# Patient Record
Sex: FEMALE | Race: WHITE | NOT HISPANIC OR LATINO | Employment: OTHER | URBAN - METROPOLITAN AREA
[De-identification: names, ages, dates, MRNs, and addresses within clinical notes are randomized per-mention and may not be internally consistent; named-entity substitution may affect disease eponyms.]

---

## 2024-10-03 ENCOUNTER — OFFICE VISIT (OUTPATIENT)
Dept: FAMILY MEDICINE | Facility: CLINIC | Age: 59
End: 2024-10-03
Payer: COMMERCIAL

## 2024-10-03 VITALS
SYSTOLIC BLOOD PRESSURE: 138 MMHG | BODY MASS INDEX: 31.89 KG/M2 | WEIGHT: 180 LBS | HEART RATE: 75 BPM | HEIGHT: 63 IN | DIASTOLIC BLOOD PRESSURE: 77 MMHG | TEMPERATURE: 98 F

## 2024-10-03 DIAGNOSIS — Z11.59 ENCOUNTER FOR HEPATITIS C SCREENING TEST FOR LOW RISK PATIENT: ICD-10-CM

## 2024-10-03 DIAGNOSIS — F41.1 GAD (GENERALIZED ANXIETY DISORDER): Primary | ICD-10-CM

## 2024-10-03 DIAGNOSIS — Z12.31 ENCOUNTER FOR SCREENING MAMMOGRAM FOR BREAST CANCER: ICD-10-CM

## 2024-10-03 DIAGNOSIS — Z13.220 ENCOUNTER FOR LIPID SCREENING FOR CARDIOVASCULAR DISEASE: ICD-10-CM

## 2024-10-03 DIAGNOSIS — Z12.11 COLON CANCER SCREENING: ICD-10-CM

## 2024-10-03 DIAGNOSIS — Z13.6 ENCOUNTER FOR LIPID SCREENING FOR CARDIOVASCULAR DISEASE: ICD-10-CM

## 2024-10-03 DIAGNOSIS — Z13.1 DIABETES MELLITUS SCREENING: ICD-10-CM

## 2024-10-03 DIAGNOSIS — Z86.018 HISTORY OF MENINGIOMA: ICD-10-CM

## 2024-10-03 DIAGNOSIS — Z00.00 ENCOUNTER FOR ANNUAL HEALTH EXAMINATION: ICD-10-CM

## 2024-10-03 PROCEDURE — 99999 PR PBB SHADOW E&M-NEW PATIENT-LVL III: CPT | Mod: PBBFAC,,, | Performed by: INTERNAL MEDICINE

## 2024-10-03 RX ORDER — FLUOXETINE 20 MG/1
20 TABLET ORAL DAILY
COMMUNITY
End: 2024-10-03 | Stop reason: SDUPTHER

## 2024-10-03 RX ORDER — FLUOXETINE 20 MG/1
20 TABLET ORAL DAILY
Qty: 90 TABLET | Refills: 3 | Status: SHIPPED | OUTPATIENT
Start: 2024-10-03

## 2024-10-03 NOTE — PROGRESS NOTES
Assessment/Plan:    Problem List Items Addressed This Visit          Psychiatric    JASON (generalized anxiety disorder) - Primary    Overview     -chronic  -symptoms controlled with Prozac 20 mg QD without AE  -plan to continue         Relevant Medications    FLUoxetine 20 MG tablet       Oncology    History of meningioma    Overview     -s/p excision in 2009 without complication  -no longer following with neurosurgery  -last MRI brain Aug 2023- no concerning findings          Other Visit Diagnoses       Encounter for screening mammogram for breast cancer        Relevant Orders    Mammo Digital Screening Bilat w/ Tj    Colon cancer screening        Relevant Orders    Cologuard Screening (Multitarget Stool DNA)    Encounter for annual health examination        Relevant Orders    Hemoglobin A1C    CBC Auto Differential    Comprehensive Metabolic Panel    TSH    Lipid Panel    Encounter for lipid screening for cardiovascular disease        Relevant Orders    Lipid Panel    Diabetes mellitus screening        Relevant Orders    Hemoglobin A1C    Encounter for hepatitis C screening test for low risk patient        Relevant Orders    Hepatitis C Antibody            Arlen Zimmerman MD  ______________________________________________________________________________________________________________________________    CC: Establish care    History of Present Illness  The patient is a 59-year-old female here to establish care.    She has been on Prozac 20 mg for anxiety and depression, which she reports as effective.     Her history includes migraines, although these are not currently problematic. She underwent surgery for meningiomas 15 years ago and had an MRI last year which was normal. She no longer has to follow with neurosurgery or neurology.    Her last provider was Dr. López, whom she saw in June 2023. She also saw her dermatologist, Dr. Ortega, in February 2024 for routine skin check.    She reports feeling well  overall and has not experienced any recent chest pain, shortness of breath, or gastrointestinal issues.    SOCIAL HISTORY  She does not smoke, drink alcohol, or use drugs.    FAMILY HISTORY  Her father has heart disease. Her mother has osteoarthritis.    IMMUNIZATIONS  She received her influenza vaccine 2 weeks ago.     No other new complaints today. Remaining chronic conditions have been reviewed and remain stable. Further detail as stated above.     Health Maintenance reviewed - annual labs and mmg ordered. She is opting for cologuard for colon cancer screening..    Past Medical History:  Past Medical History:   Diagnosis Date    Meningioma     Migraines      Past Surgical History:   Procedure Laterality Date    ADENOIDECTOMY      BRAIN SURGERY       SECTION      HYSTERECTOMY      TONSILLECTOMY      TUBAL LIGATION      TUMOR REMOVAL      Brain     Review of patient's allergies indicates:  No Known Allergies  Social History     Tobacco Use    Smoking status: Never    Smokeless tobacco: Never   Substance Use Topics    Alcohol use: Not Currently    Drug use: Never     Family History   Problem Relation Name Age of Onset    Heart disease Father       Current Outpatient Medications on File Prior to Visit   Medication Sig Dispense Refill    [DISCONTINUED] FLUoxetine 20 MG tablet Take 20 mg by mouth once daily.       No current facility-administered medications on file prior to visit.       Review of Systems   Constitutional:  Negative for chills, diaphoresis, fatigue and fever.   HENT:  Negative for congestion, ear pain, postnasal drip, sinus pain and sore throat.    Eyes:  Negative for pain and redness.   Respiratory:  Negative for cough, chest tightness and shortness of breath.    Cardiovascular:  Negative for chest pain and leg swelling.   Gastrointestinal:  Negative for abdominal pain, constipation, diarrhea, nausea and vomiting.   Genitourinary:  Negative for dysuria and hematuria.  "  Musculoskeletal:  Negative for arthralgias and joint swelling.   Skin:  Negative for rash.   Neurological:  Negative for dizziness, syncope and headaches.   Psychiatric/Behavioral:  Negative for dysphoric mood. The patient is not nervous/anxious.      Vitals:    10/03/24 0905   BP: 138/77   Pulse: 75   Temp: 97.9 °F (36.6 °C)   TempSrc: Temporal   Weight: 81.6 kg (180 lb)   Height: 5' 3" (1.6 m)       Wt Readings from Last 3 Encounters:   10/03/24 81.6 kg (180 lb)       Physical Exam  Constitutional:       General: She is not in acute distress.     Appearance: Normal appearance. She is well-developed.   HENT:      Head: Normocephalic and atraumatic.   Eyes:      Conjunctiva/sclera: Conjunctivae normal.   Cardiovascular:      Rate and Rhythm: Normal rate and regular rhythm.      Pulses: Normal pulses.      Heart sounds: Normal heart sounds. No murmur heard.  Pulmonary:      Effort: Pulmonary effort is normal. No respiratory distress.      Breath sounds: Normal breath sounds.   Abdominal:      General: Bowel sounds are normal. There is no distension.      Palpations: Abdomen is soft.      Tenderness: There is no abdominal tenderness.   Musculoskeletal:         General: Normal range of motion.      Cervical back: Normal range of motion and neck supple.   Skin:     General: Skin is warm and dry.      Findings: No rash.   Neurological:      General: No focal deficit present.      Mental Status: She is alert and oriented to person, place, and time.   Psychiatric:         Mood and Affect: Mood normal.         Behavior: Behavior normal.       DISCLAIMER: This note was compiled by using a speech recognition dictation system and therefore please be aware that typographical / speech recognition errors can and do occur.  Please contact me if you see any errors specifically.  Consent was obtained for OMER recording system prior to the visit.  "